# Patient Record
Sex: MALE | Race: WHITE | NOT HISPANIC OR LATINO
[De-identification: names, ages, dates, MRNs, and addresses within clinical notes are randomized per-mention and may not be internally consistent; named-entity substitution may affect disease eponyms.]

---

## 2017-09-07 PROBLEM — Z00.00 ENCOUNTER FOR PREVENTIVE HEALTH EXAMINATION: Status: ACTIVE | Noted: 2017-09-07

## 2018-02-05 ENCOUNTER — APPOINTMENT (OUTPATIENT)
Dept: CARDIOLOGY | Facility: CLINIC | Age: 78
End: 2018-02-05

## 2021-10-27 ENCOUNTER — APPOINTMENT (OUTPATIENT)
Dept: GASTROENTEROLOGY | Facility: CLINIC | Age: 81
End: 2021-10-27
Payer: MEDICARE

## 2021-10-27 VITALS
HEIGHT: 70 IN | BODY MASS INDEX: 29.78 KG/M2 | HEART RATE: 59 BPM | TEMPERATURE: 97.1 F | OXYGEN SATURATION: 97 % | WEIGHT: 208 LBS | SYSTOLIC BLOOD PRESSURE: 130 MMHG | DIASTOLIC BLOOD PRESSURE: 70 MMHG

## 2021-10-27 DIAGNOSIS — Z12.11 ENCOUNTER FOR SCREENING FOR MALIGNANT NEOPLASM OF COLON: ICD-10-CM

## 2021-10-27 DIAGNOSIS — Z86.010 ENCOUNTER FOR SCREENING FOR MALIGNANT NEOPLASM OF COLON: ICD-10-CM

## 2021-10-27 DIAGNOSIS — Z95.5 PRESENCE OF CORONARY ANGIOPLASTY IMPLANT AND GRAFT: ICD-10-CM

## 2021-10-27 DIAGNOSIS — M19.90 UNSPECIFIED OSTEOARTHRITIS, UNSPECIFIED SITE: ICD-10-CM

## 2021-10-27 PROCEDURE — 99214 OFFICE O/P EST MOD 30 MIN: CPT

## 2021-10-27 RX ORDER — ATENOLOL 50 MG/1
50 TABLET ORAL DAILY
Qty: 90 | Refills: 3 | Status: ACTIVE | COMMUNITY
Start: 2021-10-27 | End: 1900-01-01

## 2021-10-27 RX ORDER — SIMVASTATIN 40 MG/1
40 TABLET, FILM COATED ORAL
Refills: 0 | Status: ACTIVE | COMMUNITY

## 2021-10-27 RX ORDER — AMLODIPINE BESYLATE 10 MG/1
10 TABLET ORAL DAILY
Qty: 90 | Refills: 3 | Status: ACTIVE | COMMUNITY
Start: 2021-10-27 | End: 1900-01-01

## 2021-10-27 RX ORDER — AMLODIPINE BESYLATE 5 MG/1
TABLET ORAL
Refills: 0 | Status: ACTIVE | COMMUNITY

## 2021-10-27 RX ORDER — METFORMIN HYDROCHLORIDE 500 MG/1
500 TABLET, COATED ORAL
Refills: 0 | Status: ACTIVE | COMMUNITY

## 2021-10-27 RX ORDER — SIMVASTATIN 40 MG/1
40 TABLET, FILM COATED ORAL DAILY
Qty: 90 | Refills: 3 | Status: ACTIVE | COMMUNITY
Start: 2021-10-27 | End: 1900-01-01

## 2021-10-27 RX ORDER — MELOXICAM 15 MG/1
15 TABLET ORAL
Refills: 0 | Status: ACTIVE | COMMUNITY

## 2021-10-27 RX ORDER — HYDROCHLOROTHIAZIDE 50 MG/1
50 TABLET ORAL DAILY
Qty: 90 | Refills: 3 | Status: ACTIVE | COMMUNITY
Start: 2021-10-27 | End: 1900-01-01

## 2021-10-27 RX ORDER — METFORMIN HYDROCHLORIDE 500 MG/1
500 TABLET, COATED ORAL DAILY
Qty: 90 | Refills: 3 | Status: ACTIVE | COMMUNITY
Start: 2021-10-27 | End: 1900-01-01

## 2021-10-27 RX ORDER — ATENOLOL 50 MG/1
50 TABLET ORAL
Refills: 0 | Status: ACTIVE | COMMUNITY

## 2021-10-27 RX ORDER — HYDROCHLOROTHIAZIDE 50 MG/1
50 TABLET ORAL
Refills: 0 | Status: ACTIVE | COMMUNITY

## 2021-10-27 NOTE — REVIEW OF SYSTEMS
[Arthralgias] : arthralgias [Joint Swelling] : no joint swelling [Joint Stiffness] : joint stiffness [Limb Pain] : no limb pain [Limb Swelling] : no limb swelling [Negative] : Heme/Lymph [FreeTextEntry9] : Lower back pain, no neurological deficit.

## 2021-10-27 NOTE — HISTORY OF PRESENT ILLNESS
[Heartburn] : denies heartburn [Nausea] : denies nausea [Vomiting] : denies vomiting [Diarrhea] : denies diarrhea [Constipation] : denies constipation [Yellow Skin Or Eyes (Jaundice)] : denies jaundice [Abdominal Pain] : denies abdominal pain [Abdominal Swelling] : denies abdominal swelling [Rectal Pain] : denies rectal pain [Wt Gain ___ Lbs] : no recent weight gain [Wt Loss ___ Lbs] : no recent weight loss [GERD] : no gastroesophageal reflux disease [Peptic Ulcer Disease] : no peptic ulcer disease [Pancreatitis] : no pancreatitis [Cholelithiasis] : no cholelithiasis [Inflammatory Bowel Disease] : no inflammatory bowel disease [Irritable Bowel Syndrome] : no irritable bowel syndrome [Diverticulitis] : no diverticulitis [Alcohol Abuse] : no alcohol abuse [Malignancy] : no malignancy [Abdominal Surgery] : no abdominal surgery [Appendectomy] : no appendectomy [Cholecystectomy] : no cholecystectomy [de-identified] : Patient has no GI related symptoms at this time denies any heartburn, dysphagia, melena, hematochezia, epigastric pain.  His bowel habits are normal once a day without any recent change.  No unexplained weight loss.  His last colonoscopy was September 2020 9 mm polyp was removed which was adenomatous follow-up colonoscopy was advised in 3 to 5 years. [FreeTextEntry1] : Patient has history of hypertension, diabetes mellitus type 2 both of them are well controlled hemoglobin A1c 6.4.  No complication of diabetes or hypertension normal renal function no diabetic retinopathy no leg ulcers.  History of coronary artery disease status post coronary artery stents no history of angina, CHF, arrhythmia, COPD, asthma.  History of osteoarthritis with involvement of all joints and spine causing chronic lower back pain and joint pains and knee pain history of benign prostate hypertrophy PSA level is normal 2.4.  History of hyperlipidemia well-controlled for total cholesterol, LDL are normal so is the HDL.  Triglycerides are also normal.  His liver functions are normal his white blood cell count and hemoglobin is normal monocytes count increased to 1100 04 upper limit normal being 950.

## 2021-10-27 NOTE — ASSESSMENT
[FreeTextEntry1] : Patient with history of hypertension, diabetes mellitus type 2, hyperlipidemia all well controlled.  Coronary artery disease with no angina, CHF, arrhythmia.  Osteoarthritis involving multiple joints and spine functions independently.  History of diverticulosis asymptomatic history of colon polyp patient to have surveillance colonoscopy in 3 to 5 years.  All his medications renewed.  I went over all his medications with him and also reviewed his allergies.

## 2021-10-27 NOTE — PHYSICAL EXAM
[General Appearance - Alert] : alert [General Appearance - In No Acute Distress] : in no acute distress [Sclera] : the sclera and conjunctiva were normal [PERRL With Normal Accommodation] : pupils were equal in size, round, and reactive to light [Extraocular Movements] : extraocular movements were intact [FreeTextEntry1] : On medial side of the right eye there is conjunctival bleeding. [Outer Ear] : the ears and nose were normal in appearance [Oropharynx] : the oropharynx was normal [Neck Appearance] : the appearance of the neck was normal [Neck Cervical Mass (___cm)] : no neck mass was observed [Jugular Venous Distention Increased] : there was no jugular-venous distention [Thyroid Diffuse Enlargement] : the thyroid was not enlarged [Thyroid Nodule] : there were no palpable thyroid nodules [Heart Rate And Rhythm] : heart rate was normal and rhythm regular [Heart Sounds] : normal S1 and S2 [Heart Sounds Gallop] : no gallops [Murmurs] : no murmurs [Heart Sounds Pericardial Friction Rub] : no pericardial rub [Full Pulse] : the pedal pulses are present [Edema] : there was no peripheral edema [Breast Appearance] : normal in appearance [Breast Palpation Mass] : no palpable masses [Bowel Sounds] : normal bowel sounds [Abdomen Soft] : soft [Abdomen Tenderness] : non-tender [Abdomen Mass (___ Cm)] : no abdominal mass palpated [Cervical Lymph Nodes Enlarged Posterior Bilaterally] : posterior cervical [Cervical Lymph Nodes Enlarged Anterior Bilaterally] : anterior cervical [Supraclavicular Lymph Nodes Enlarged Bilaterally] : supraclavicular [Axillary Lymph Nodes Enlarged Bilaterally] : axillary [Femoral Lymph Nodes Enlarged Bilaterally] : femoral [Inguinal Lymph Nodes Enlarged Bilaterally] : inguinal [No CVA Tenderness] : no ~M costovertebral angle tenderness [No Spinal Tenderness] : no spinal tenderness [Abnormal Walk] : normal gait [Nail Clubbing] : no clubbing  or cyanosis of the fingernails [Musculoskeletal - Swelling] : no joint swelling seen [Motor Tone] : muscle strength and tone were normal [Skin Color & Pigmentation] : normal skin color and pigmentation [Skin Turgor] : normal skin turgor [] : no rash [Deep Tendon Reflexes (DTR)] : deep tendon reflexes were 2+ and symmetric [Sensation] : the sensory exam was normal to light touch and pinprick [No Focal Deficits] : no focal deficits [Oriented To Time, Place, And Person] : oriented to person, place, and time [Impaired Insight] : insight and judgment were intact [Affect] : the affect was normal

## 2022-07-29 DIAGNOSIS — Z01.818 ENCOUNTER FOR OTHER PREPROCEDURAL EXAMINATION: ICD-10-CM

## 2022-08-22 ENCOUNTER — APPOINTMENT (OUTPATIENT)
Dept: GASTROENTEROLOGY | Facility: CLINIC | Age: 82
End: 2022-08-22

## 2022-08-22 DIAGNOSIS — Z78.9 OTHER SPECIFIED HEALTH STATUS: ICD-10-CM

## 2022-08-22 DIAGNOSIS — Z86.79 PERSONAL HISTORY OF OTHER DISEASES OF THE CIRCULATORY SYSTEM: ICD-10-CM

## 2022-08-22 DIAGNOSIS — I10 ESSENTIAL (PRIMARY) HYPERTENSION: ICD-10-CM

## 2022-08-22 DIAGNOSIS — K57.30 DIVERTICULOSIS OF LARGE INTESTINE W/OUT PERFORATION OR ABSCESS W/OUT BLEEDING: ICD-10-CM

## 2022-08-22 DIAGNOSIS — S02.2XXA FRACTURE OF NASAL BONES, INITIAL ENCOUNTER FOR CLOSED FRACTURE: ICD-10-CM

## 2022-08-22 DIAGNOSIS — Z83.3 FAMILY HISTORY OF DIABETES MELLITUS: ICD-10-CM

## 2022-08-22 DIAGNOSIS — E78.5 HYPERLIPIDEMIA, UNSPECIFIED: ICD-10-CM

## 2022-08-22 DIAGNOSIS — E11.37X3 TYPE 2 DIABETES MELLITUS WITH DIABETIC MACULAR EDEMA, RESOLVED FOLLOWING TREATMENT, BILATERAL: ICD-10-CM

## 2022-08-22 DIAGNOSIS — Z86.39 PERSONAL HISTORY OF OTHER ENDOCRINE, NUTRITIONAL AND METABOLIC DISEASE: ICD-10-CM

## 2022-08-22 DIAGNOSIS — Z80.0 FAMILY HISTORY OF MALIGNANT NEOPLASM OF DIGESTIVE ORGANS: ICD-10-CM

## 2022-08-22 PROCEDURE — 99214 OFFICE O/P EST MOD 30 MIN: CPT

## 2022-08-22 PROCEDURE — G0404: CPT

## 2022-08-22 NOTE — HISTORY OF PRESENT ILLNESS
[Coronary Artery Disease] : coronary artery disease [No Pertinent Pulmonary History] : no history of asthma, COPD, sleep apnea, or smoking [No Adverse Anesthesia Reaction] : no adverse anesthesia reaction in self or family member [FreeTextEntry1] : 09/01/2022 [FreeTextEntry3] : Dr Ramirez [FreeTextEntry4] : The patient is an 82-year-old male with a history of hypertension, hyperlipidemia and type 2 diabetes and cardiac stent.  Patient is tentatively scheduled for septoplasty as he had a recent fall and had a nasal fracture.  Patient denies any current history of chest pain or shortness of breath and his appetite is good with no dysphagia or unexplained weight loss.  Patient's bowel movements are normal with no blood in the stool or on the toilet tissue.  The patient has noticed some respiratory issues after the fracture.  The patient had an innocent fall and with no prior dizziness or syncopal episodes. [FreeTextEntry7] : The patient's cardiogram today reveals sinus bradycardia with no ST elevations.

## 2022-08-22 NOTE — PHYSICAL EXAM
[Normal] : normal gait, coordination grossly intact, no focal deficits and deep tendon reflexes were 2+ and symmetric [de-identified] : Nasal deviation. [de-identified] : Lipoma of back.

## 2022-08-22 NOTE — RESULTS/DATA
[] : results reviewed [de-identified] : normal [de-identified] : normal [de-identified] : mild elevation of glucose [de-identified] : sinus bradycardia

## 2022-08-22 NOTE — PLAN
[FreeTextEntry1] : The patient is an 82-year-old male with a history of type 2 diabetes, coronary artery disease, hyperlipidemia and hypertension.  The patient's medication list was reviewed and reconciled.  The patient's chronic medical issues are well controlled and today's cardiogram reveals sinus bradycardia with relatively normal blood work.  There are no current medical contraindications to the patient undergoing the planned surgery.  The patient was instructed to take his antihypertensive medication the morning of the procedure with a sip of water.  He may suspend his diabetes medication and lipid-lowering agent the day of the procedure.\par Please feel free to contact this office if further clarification is needed.

## 2022-08-22 NOTE — REVIEW OF SYSTEMS
[Fever] : fever [Chills] : no chills [Night Sweats] : night sweats [Nasal Discharge] : nasal discharge [Chest Pain] : chest pain [Palpitations] : palpitations [Claudication] : leg claudication [Orthopena] : orthopnea [Negative] : Neurological [FreeTextEntry4] : Some nasal congestion after recent fracture. [FreeTextEntry8] : From nocturnal urinations.